# Patient Record
Sex: FEMALE | Race: WHITE | Employment: UNEMPLOYED | URBAN - METROPOLITAN AREA
[De-identification: names, ages, dates, MRNs, and addresses within clinical notes are randomized per-mention and may not be internally consistent; named-entity substitution may affect disease eponyms.]

---

## 2017-01-25 RX ORDER — ALENDRONATE SODIUM 70 MG/1
TABLET ORAL
Qty: 4 TABLET | Refills: 5 | Status: SHIPPED | OUTPATIENT
Start: 2017-01-25 | End: 2017-07-19 | Stop reason: SDUPTHER

## 2017-04-04 RX ORDER — ACETAMINOPHEN 325 MG/1
TABLET ORAL
Qty: 30 TABLET | Refills: 5 | Status: SHIPPED | OUTPATIENT
Start: 2017-04-04 | End: 2018-04-03 | Stop reason: SDUPTHER

## 2017-04-12 RX ORDER — GABAPENTIN 100 MG/1
CAPSULE ORAL
Qty: 28 CAPSULE | Refills: 5 | Status: SHIPPED | OUTPATIENT
Start: 2017-04-12 | End: 2017-09-26 | Stop reason: SDUPTHER

## 2017-04-12 RX ORDER — DOXEPIN HYDROCHLORIDE 25 MG/1
CAPSULE ORAL
Qty: 28 CAPSULE | Refills: 5 | Status: SHIPPED | OUTPATIENT
Start: 2017-04-12 | End: 2017-09-26 | Stop reason: SDUPTHER

## 2017-04-17 RX ORDER — LATANOPROST 50 UG/ML
SOLUTION/ DROPS OPHTHALMIC
Qty: 3 BOTTLE | Refills: 5 | Status: SHIPPED | OUTPATIENT
Start: 2017-04-17 | End: 2018-01-03 | Stop reason: SDUPTHER

## 2017-05-08 RX ORDER — FAMOTIDINE 20 MG/1
TABLET, FILM COATED ORAL
Qty: 56 TABLET | Refills: 11 | Status: SHIPPED | OUTPATIENT
Start: 2017-05-08 | End: 2017-12-27 | Stop reason: SDUPTHER

## 2017-06-07 RX ORDER — LORAZEPAM 0.5 MG/1
TABLET ORAL
Qty: 84 TABLET | Refills: 5 | OUTPATIENT
Start: 2017-06-07 | End: 2017-11-21 | Stop reason: SDUPTHER

## 2017-06-27 RX ORDER — TRAMADOL HYDROCHLORIDE 50 MG/1
TABLET ORAL
Qty: 20 TABLET | Refills: 5 | Status: SHIPPED | OUTPATIENT
Start: 2017-06-27

## 2017-07-19 RX ORDER — ALENDRONATE SODIUM 70 MG/1
TABLET ORAL
Qty: 4 TABLET | Refills: 11 | Status: SHIPPED | OUTPATIENT
Start: 2017-07-19

## 2017-08-01 ENCOUNTER — TELEPHONE (OUTPATIENT)
Dept: INTERNAL MEDICINE CLINIC | Age: 82
End: 2017-08-01

## 2017-08-07 RX ORDER — PROMETHAZINE HYDROCHLORIDE 25 MG/1
TABLET ORAL
Qty: 30 TABLET | Refills: 2 | Status: SHIPPED | OUTPATIENT
Start: 2017-08-07 | End: 2017-09-18 | Stop reason: SDUPTHER

## 2017-08-30 RX ORDER — CHOLECALCIFEROL (VITAMIN D3) 125 MCG
CAPSULE ORAL
Qty: 28 TABLET | Refills: 11 | Status: SHIPPED | OUTPATIENT
Start: 2017-08-30

## 2017-08-30 RX ORDER — POTASSIUM CHLORIDE 750 MG/1
TABLET, FILM COATED, EXTENDED RELEASE ORAL
Qty: 112 TABLET | Refills: 11 | Status: SHIPPED | OUTPATIENT
Start: 2017-08-30

## 2017-09-18 RX ORDER — PROMETHAZINE HYDROCHLORIDE 25 MG/1
TABLET ORAL
Qty: 30 TABLET | Refills: 2 | Status: SHIPPED | OUTPATIENT
Start: 2017-09-18 | End: 2017-10-30 | Stop reason: SDUPTHER

## 2017-09-26 RX ORDER — DOXEPIN HYDROCHLORIDE 25 MG/1
CAPSULE ORAL
Qty: 28 CAPSULE | Refills: 5 | Status: SHIPPED | OUTPATIENT
Start: 2017-09-26

## 2017-09-26 RX ORDER — GABAPENTIN 100 MG/1
CAPSULE ORAL
Qty: 28 CAPSULE | Refills: 5 | Status: SHIPPED | OUTPATIENT
Start: 2017-09-26

## 2017-10-25 RX ORDER — ATORVASTATIN CALCIUM 10 MG/1
TABLET, FILM COATED ORAL
Qty: 28 TABLET | Refills: 4 | Status: SHIPPED | OUTPATIENT
Start: 2017-10-25 | End: 2018-03-14 | Stop reason: SDUPTHER

## 2017-10-25 RX ORDER — QUETIAPINE FUMARATE 25 MG/1
TABLET, FILM COATED ORAL
Qty: 56 TABLET | Refills: 10 | Status: SHIPPED | OUTPATIENT
Start: 2017-10-25

## 2017-10-30 RX ORDER — PROMETHAZINE HYDROCHLORIDE 25 MG/1
TABLET ORAL
Qty: 30 TABLET | Refills: 2 | Status: SHIPPED | OUTPATIENT
Start: 2017-10-30 | End: 2018-05-13 | Stop reason: SDUPTHER

## 2017-11-22 RX ORDER — LORAZEPAM 0.5 MG/1
TABLET ORAL
Qty: 84 TABLET | Refills: 5 | Status: SHIPPED | OUTPATIENT
Start: 2017-11-22 | End: 2017-11-24 | Stop reason: SDUPTHER

## 2017-11-27 RX ORDER — LORAZEPAM 0.5 MG/1
TABLET ORAL
Qty: 84 TABLET | Refills: 5 | OUTPATIENT
Start: 2017-11-27 | End: 2018-04-11 | Stop reason: SDUPTHER

## 2017-12-27 RX ORDER — ERGOCALCIFEROL 1.25 MG/1
CAPSULE ORAL
Qty: 1 CAPSULE | Refills: 5 | Status: SHIPPED | OUTPATIENT
Start: 2017-12-27 | End: 2018-01-17 | Stop reason: SDUPTHER

## 2017-12-27 RX ORDER — MELATONIN
Qty: 28 TABLET | Refills: 5 | Status: SHIPPED | OUTPATIENT
Start: 2017-12-27

## 2017-12-27 RX ORDER — FAMOTIDINE 20 MG/1
TABLET, FILM COATED ORAL
Qty: 14 TABLET | Refills: 5 | Status: SHIPPED | OUTPATIENT
Start: 2017-12-27 | End: 2018-01-17 | Stop reason: SDUPTHER

## 2018-01-03 ENCOUNTER — TELEPHONE (OUTPATIENT)
Dept: INTERNAL MEDICINE CLINIC | Age: 83
End: 2018-01-03

## 2018-01-03 RX ORDER — LATANOPROST 50 UG/ML
SOLUTION/ DROPS OPHTHALMIC
Qty: 3 BOTTLE | Refills: 5 | Status: SHIPPED | OUTPATIENT
Start: 2018-01-03 | End: 2018-06-27 | Stop reason: SDUPTHER

## 2018-01-03 NOTE — TELEPHONE ENCOUNTER
Alex called to  to change directions for Latanoprost Opth drops. Directions currently say 1 drop in both eyes  qhs. The nurse wants directions changed to 1 drop in right eye only.

## 2018-01-04 RX ORDER — POLYETHYLENE GLYCOL 3350 17 G/17G
POWDER, FOR SOLUTION ORAL
Qty: 527 G | Refills: 5 | Status: SHIPPED | OUTPATIENT
Start: 2018-01-04 | End: 2019-03-03 | Stop reason: SDUPTHER

## 2018-01-18 RX ORDER — LANOLIN ALCOHOL/MO/W.PET/CERES
CREAM (GRAM) TOPICAL
Qty: 56 TABLET | Refills: 5 | Status: SHIPPED | OUTPATIENT
Start: 2018-01-18 | End: 2018-07-05 | Stop reason: SDUPTHER

## 2018-01-18 RX ORDER — FAMOTIDINE 20 MG/1
TABLET, FILM COATED ORAL
Qty: 28 TABLET | Refills: 5 | Status: SHIPPED | OUTPATIENT
Start: 2018-01-18 | End: 2018-07-05 | Stop reason: SDUPTHER

## 2018-01-18 RX ORDER — ERGOCALCIFEROL 1.25 MG/1
CAPSULE ORAL
Qty: 4 CAPSULE | Refills: 3 | Status: SHIPPED | OUTPATIENT
Start: 2018-01-18 | End: 2019-02-12 | Stop reason: SDUPTHER

## 2018-02-01 ENCOUNTER — TELEPHONE (OUTPATIENT)
Dept: INTERNAL MEDICINE CLINIC | Age: 83
End: 2018-02-01

## 2018-02-14 RX ORDER — ACETAMINOPHEN 160 MG
TABLET,DISINTEGRATING ORAL
Qty: 84 CAPSULE | Refills: 5 | Status: SHIPPED | OUTPATIENT
Start: 2018-02-14 | End: 2018-08-02 | Stop reason: SDUPTHER

## 2018-02-23 ENCOUNTER — TELEPHONE (OUTPATIENT)
Dept: INTERNAL MEDICINE CLINIC | Age: 83
End: 2018-02-23

## 2018-03-15 RX ORDER — ATORVASTATIN CALCIUM 10 MG/1
TABLET, FILM COATED ORAL
Qty: 28 TABLET | Refills: 11 | Status: SHIPPED | OUTPATIENT
Start: 2018-03-15 | End: 2019-01-16 | Stop reason: SDUPTHER

## 2018-04-03 RX ORDER — ACETAMINOPHEN 325 MG/1
TABLET ORAL
Qty: 30 TABLET | Refills: 5 | Status: SHIPPED | OUTPATIENT
Start: 2018-04-03 | End: 2019-01-12 | Stop reason: SDUPTHER

## 2018-04-12 RX ORDER — LORAZEPAM 0.5 MG/1
TABLET ORAL
Qty: 84 TABLET | Refills: 5 | Status: SHIPPED | OUTPATIENT
Start: 2018-04-12 | End: 2018-09-26 | Stop reason: SDUPTHER

## 2018-05-14 RX ORDER — PROMETHAZINE HYDROCHLORIDE 25 MG/1
TABLET ORAL
Qty: 30 TABLET | Refills: 5 | Status: SHIPPED | OUTPATIENT
Start: 2018-05-14 | End: 2018-12-10 | Stop reason: SDUPTHER

## 2018-06-06 RX ORDER — MIRTAZAPINE 15 MG/1
TABLET, FILM COATED ORAL
Qty: 28 TABLET | Refills: 5 | Status: SHIPPED | OUTPATIENT
Start: 2018-06-06 | End: 2018-10-17 | Stop reason: SDUPTHER

## 2018-06-06 RX ORDER — OLANZAPINE 5 MG/1
TABLET ORAL
Qty: 28 TABLET | Refills: 5 | Status: SHIPPED | OUTPATIENT
Start: 2018-06-06

## 2018-06-28 RX ORDER — LATANOPROST 50 UG/ML
SOLUTION/ DROPS OPHTHALMIC
Qty: 3 ML | Refills: 3 | Status: SHIPPED | OUTPATIENT
Start: 2018-06-28 | End: 2018-12-23 | Stop reason: SDUPTHER

## 2018-07-06 RX ORDER — LANOLIN ALCOHOL/MO/W.PET/CERES
CREAM (GRAM) TOPICAL
Qty: 56 TABLET | Refills: 11 | Status: SHIPPED | OUTPATIENT
Start: 2018-07-06 | End: 2019-05-07 | Stop reason: SDUPTHER

## 2018-07-06 RX ORDER — FAMOTIDINE 20 MG/1
TABLET, FILM COATED ORAL
Qty: 28 TABLET | Refills: 11 | Status: SHIPPED | OUTPATIENT
Start: 2018-07-06 | End: 2019-05-07 | Stop reason: SDUPTHER

## 2018-08-02 RX ORDER — ACETAMINOPHEN 160 MG
TABLET,DISINTEGRATING ORAL
Qty: 84 CAPSULE | Refills: 11 | Status: SHIPPED | OUTPATIENT
Start: 2018-08-02 | End: 2019-05-07 | Stop reason: SDUPTHER

## 2018-09-26 ENCOUNTER — TELEPHONE (OUTPATIENT)
Dept: INTERNAL MEDICINE CLINIC | Age: 83
End: 2018-09-26

## 2018-09-26 DIAGNOSIS — F41.9 ANXIETY: Primary | ICD-10-CM

## 2018-09-26 RX ORDER — LORAZEPAM 0.5 MG/1
TABLET ORAL
Qty: 84 TABLET | Refills: 5 | Status: SHIPPED | OUTPATIENT
Start: 2018-09-26 | End: 2019-03-12 | Stop reason: SDUPTHER

## 2018-10-18 RX ORDER — MIRTAZAPINE 15 MG/1
TABLET, FILM COATED ORAL
Qty: 28 TABLET | Refills: 5 | Status: SHIPPED | OUTPATIENT
Start: 2018-10-18 | End: 2019-04-05 | Stop reason: SDUPTHER

## 2018-12-10 RX ORDER — PROMETHAZINE HYDROCHLORIDE 25 MG/1
TABLET ORAL
Qty: 30 TABLET | Refills: 5 | Status: SHIPPED | OUTPATIENT
Start: 2018-12-10

## 2018-12-24 RX ORDER — LATANOPROST 50 UG/ML
SOLUTION/ DROPS OPHTHALMIC
Qty: 2.5 ML | Refills: 5 | Status: SHIPPED | OUTPATIENT
Start: 2018-12-24 | End: 2019-01-22 | Stop reason: SDUPTHER

## 2019-01-14 RX ORDER — ACETAMINOPHEN 325 MG/1
TABLET ORAL
Qty: 30 TABLET | Refills: 5 | Status: SHIPPED | OUTPATIENT
Start: 2019-01-14

## 2019-01-17 RX ORDER — ATORVASTATIN CALCIUM 10 MG/1
TABLET, FILM COATED ORAL
Qty: 28 TABLET | Refills: 11 | Status: SHIPPED | OUTPATIENT
Start: 2019-01-17

## 2019-01-22 RX ORDER — LATANOPROST 50 UG/ML
SOLUTION/ DROPS OPHTHALMIC
Qty: 2.5 ML | Refills: 0 | Status: SHIPPED | OUTPATIENT
Start: 2019-01-22

## 2019-02-13 RX ORDER — ERGOCALCIFEROL 1.25 MG/1
CAPSULE ORAL
Qty: 4 CAPSULE | Refills: 3 | Status: SHIPPED | OUTPATIENT
Start: 2019-02-13

## 2019-02-15 RX ORDER — ONDANSETRON 4 MG/1
TABLET, FILM COATED ORAL
Qty: 30 TABLET | Refills: 2 | Status: SHIPPED | OUTPATIENT
Start: 2019-02-15 | End: 2019-04-13 | Stop reason: SDUPTHER

## 2019-03-04 RX ORDER — POLYETHYLENE GLYCOL 3350 17 G/17G
POWDER, FOR SOLUTION ORAL
Qty: 527 G | Refills: 3 | Status: SHIPPED | OUTPATIENT
Start: 2019-03-04

## 2019-03-12 DIAGNOSIS — F41.9 ANXIETY: ICD-10-CM

## 2019-03-13 RX ORDER — LORAZEPAM 0.5 MG/1
TABLET ORAL
Qty: 90 TABLET | Refills: 5 | Status: SHIPPED | OUTPATIENT
Start: 2019-03-13 | End: 2019-07-22 | Stop reason: SDUPTHER

## 2019-04-05 RX ORDER — MIRTAZAPINE 15 MG/1
TABLET, FILM COATED ORAL
Qty: 28 TABLET | Refills: 5 | Status: SHIPPED | OUTPATIENT
Start: 2019-04-05

## 2019-04-15 RX ORDER — ONDANSETRON 4 MG/1
TABLET, FILM COATED ORAL
Qty: 30 TABLET | Refills: 5 | Status: SHIPPED | OUTPATIENT
Start: 2019-04-15

## 2019-05-08 RX ORDER — ACETAMINOPHEN 160 MG
TABLET,DISINTEGRATING ORAL
Qty: 84 CAPSULE | Refills: 5 | Status: SHIPPED | OUTPATIENT
Start: 2019-05-08

## 2019-05-08 RX ORDER — FAMOTIDINE 20 MG/1
TABLET, FILM COATED ORAL
Qty: 28 TABLET | Refills: 11 | Status: SHIPPED | OUTPATIENT
Start: 2019-05-08

## 2019-06-25 ENCOUNTER — TELEPHONE (OUTPATIENT)
Dept: INTERNAL MEDICINE CLINIC | Age: 84
End: 2019-06-25

## 2019-06-25 NOTE — TELEPHONE ENCOUNTER
Pt's niece wants to know if Dr Fredy Pulmmer is not her aunt's dr and hasn't seen her for 5 years, why is there a signed physical form in her chart at [facility].     Don or Dr Daniel Cremaggie - please call the niece

## 2019-07-10 ENCOUNTER — TELEPHONE (OUTPATIENT)
Dept: INTERNAL MEDICINE CLINIC | Age: 84
End: 2019-07-10

## 2019-07-22 DIAGNOSIS — F41.9 ANXIETY: ICD-10-CM

## 2019-07-23 RX ORDER — LORAZEPAM 0.5 MG/1
TABLET ORAL
Qty: 90 TABLET | Refills: 5 | Status: SHIPPED | OUTPATIENT
Start: 2019-07-23 | End: 2020-01-19

## 2019-08-05 ENCOUNTER — TELEPHONE (OUTPATIENT)
Dept: INTERNAL MEDICINE CLINIC | Age: 84
End: 2019-08-05

## 2019-08-05 NOTE — TELEPHONE ENCOUNTER
Calling with EKG abnormal results:    Sinus tachycardia  Normal QTc  Consider inferior MI age undetermined  Abnormal EKG   Abnormal ECG      Patient is not walking. Real unsteady on feet. Took two to get up.   Awaiting urine and blood test results tomorrow     Faxing EKG results now

## 2019-09-11 ENCOUNTER — TELEPHONE (OUTPATIENT)
Dept: INTERNAL MEDICINE CLINIC | Age: 84
End: 2019-09-11

## 2019-09-13 ENCOUNTER — TELEPHONE (OUTPATIENT)
Dept: INTERNAL MEDICINE CLINIC | Age: 84
End: 2019-09-13

## 2019-09-13 NOTE — TELEPHONE ENCOUNTER
FYI   Nurse calling stating that they found patient on the floor, laying flat on back and feet straight out  Red area on lower thoracic spine  Patient states that she hit her head but she is up and alert.    Neuro assessment good  Vitals were stable /54 o2 94%

## 2019-09-25 ENCOUNTER — OUTSIDE SERVICES (OUTPATIENT)
Dept: WOUND CARE | Age: 84
End: 2019-09-25
Payer: MEDICARE

## 2019-09-25 DIAGNOSIS — L89.620 PRESSURE INJURY OF LEFT HEEL, UNSTAGEABLE (HCC): ICD-10-CM

## 2019-09-25 DIAGNOSIS — S31.801A TEAR OF SKIN OF BUTTOCK, UNSPECIFIED LATERALITY, INITIAL ENCOUNTER: ICD-10-CM

## 2019-09-25 PROCEDURE — 99308 SBSQ NF CARE LOW MDM 20: CPT | Performed by: CLINICAL NURSE SPECIALIST

## 2019-09-25 NOTE — PROGRESS NOTES
88 [facility]    Patient name: Bienvenido Alanis  :   [birth year]  Facility:  [address] [place] [address] of Service: skilled nursing facility (95)    Primary diagnosis for wound-care consultation: Unstageable pressure area to left heel and skin tear gluteal cleft. Additional ulcer(s) noted? None    History of Present Illness: Patient with recent fractured left hip. Recent sepsis secondary to UTI, dysphagia, severe episodes of recurrent major depressive disorder, anxiety, protein calorie malnutrition, Parkinson's disease and essential hypertension. Patient is incontinent. Review of Systems: Pertinent systems reviewed in the HPI; all other systems reviewed, and negative. Pertinent elements of past medical, surgical, family, and/or social history: As above    Medications and allergies are detailed in the nursing home chart, and were reviewed by me today.  _______________________    General Physical exam:    Vital signs:  Blood pressure 112/49, temperature 90.8, pulse 93, respirations 16, 96% saturation on 2 L    General Appearance: Alert, well developed and well-nourished, in no acute distress  Psychiatric:  Mood and affect appropriate for situation  Skin: warm and dry, no rash  Head: normocephalic and atraumatic  Eyes: pupils equal, round, sclerae anicteric, conjunctivae normal  ENT: no thrush or oral ulcers  Neck:No complaints, normal appearance  Pulmonary/Chest: Respirations easy at rest, no cough or respiratory distress  Cardiovascular: No chest pain, normal rate, toes warm, cap refill normal, PT and DP pulse palpable  Abdomen: No nausea or vomiting  Extremities: no cyanosis, edema or cellulitis  Musculoskeletal: Nonambulatory, moves all extremities minimally, no deformities  Neurologic: distal sensation to light touch intact, no allodynia.       Wound exam:    Wound location: Gluteal cleft   Length (cm) 2   Width (cm) 0.5   Depth (cm) 0.1   Tunneling 0   Undermining 0    Wound type:   Skin Tear  Grade - stage - thickness: Partial thickness     Description of periwound: intact    Description of wound bed: Wound with red base. Wound bed moist, small amount of serous drainage, edges open and attached. Surrounding tissue and ulcer without signs and symptoms of infection. No purulence, malodor, erythema, increased temperature, or increased pain. _Wound exam:    Wound location: Left heel   Length (cm) 3   Width (cm) 5   Depth (cm) 0.1   Tunneling 0   Undermining 0    Wound type:   Pressure  Grade - stage - thickness: Unstageable     Description of periwound: Intact    Description of wound bed: Wound with dry, fully adherent, intact eschar. Surrounding tissue and ulcer without signs and symptoms of infection. No purulence, malodor, erythema, increased temperature, or increased pain.  ______________________    Recent labs and data reviewed: 9/20/2019: Hemoglobin/hematocrit: 7.3/22.1, BUN 14, creatinine 0.57, GFR 85  _______________________     Blondell Flock diagnoses & assessment:  Unstageable pressure ulcer left heel and skin tear to gluteal cleft    Debridement is not indicated today, based on the history and exam above.  _______________________    Procedure:    Consent obtained. Time out performed per [facility]. Dressing applied  _______________________    Recommendations:    - Dressings / Compression / Offloading: Betadine left heel daily. Bilateral off loading boots. Triad, Aquacel Ag and hydrocolloid to gluteal cleft. Low air loss mattress. Side to side when in bed. Pressure redistribution chair cushion. Minimal time in chair.     - Labs / Diagnostic studies: none    - Medications / nutritional support: Stress tab with zinc daily. Promod 30 mL daily. Ensure Plus 2 times a day.     - Further Consultations recommended: OT/PT/ST following    - Anticipated follow-up: Weekly

## 2019-10-08 ENCOUNTER — HOSPITAL ENCOUNTER (OUTPATIENT)
Dept: GENERAL RADIOLOGY | Age: 84
Discharge: HOME | End: 2019-10-08
Payer: COMMERCIAL

## 2019-10-08 ENCOUNTER — HOSPITAL ENCOUNTER (OUTPATIENT)
Dept: SPEECH THERAPY | Age: 84
Setting detail: THERAPIES SERIES
Discharge: HOME | End: 2019-10-08
Payer: MEDICARE

## 2019-10-08 DIAGNOSIS — R13.10 DYSPHAGIA, UNSPECIFIED TYPE: ICD-10-CM

## 2019-10-08 DIAGNOSIS — R13.12 OROPHARYNGEAL DYSPHAGIA: Primary | ICD-10-CM

## 2019-10-08 PROCEDURE — 92611 MOTION FLUOROSCOPY/SWALLOW: CPT

## 2019-10-08 PROCEDURE — 74230 X-RAY XM SWLNG FUNCJ C+: CPT

## 2019-10-09 ENCOUNTER — OUTSIDE SERVICES (OUTPATIENT)
Dept: WOUND CARE | Age: 84
End: 2019-10-09
Payer: MEDICARE

## 2019-10-09 DIAGNOSIS — L89.620 PRESSURE INJURY OF LEFT HEEL, UNSTAGEABLE (HCC): ICD-10-CM

## 2019-10-09 PROCEDURE — 99308 SBSQ NF CARE LOW MDM 20: CPT | Performed by: CLINICAL NURSE SPECIALIST

## 2019-10-23 ENCOUNTER — OUTSIDE SERVICES (OUTPATIENT)
Dept: WOUND CARE | Age: 84
End: 2019-10-23
Payer: MEDICARE

## 2019-10-23 DIAGNOSIS — L89.620 PRESSURE INJURY OF LEFT HEEL, UNSTAGEABLE (HCC): ICD-10-CM

## 2019-10-23 PROCEDURE — 97597 DBRDMT OPN WND 1ST 20 CM/<: CPT | Performed by: CLINICAL NURSE SPECIALIST

## 2019-10-23 PROCEDURE — 99308 SBSQ NF CARE LOW MDM 20: CPT | Performed by: CLINICAL NURSE SPECIALIST

## 2019-11-06 ENCOUNTER — OUTSIDE SERVICES (OUTPATIENT)
Dept: WOUND CARE | Age: 84
End: 2019-11-06
Payer: MEDICARE

## 2019-11-06 DIAGNOSIS — L89.620 PRESSURE INJURY OF LEFT HEEL, UNSTAGEABLE (HCC): ICD-10-CM

## 2019-11-06 PROCEDURE — 11042 DBRDMT SUBQ TIS 1ST 20SQCM/<: CPT | Performed by: CLINICAL NURSE SPECIALIST

## 2019-11-06 PROCEDURE — 99308 SBSQ NF CARE LOW MDM 20: CPT | Performed by: CLINICAL NURSE SPECIALIST

## 2019-11-13 ENCOUNTER — OUTSIDE SERVICES (OUTPATIENT)
Dept: WOUND CARE | Age: 84
End: 2019-11-13
Payer: MEDICARE

## 2019-11-13 DIAGNOSIS — L89.620 PRESSURE INJURY OF LEFT HEEL, UNSTAGEABLE (HCC): ICD-10-CM

## 2019-11-13 PROCEDURE — 97597 DBRDMT OPN WND 1ST 20 CM/<: CPT | Performed by: CLINICAL NURSE SPECIALIST

## 2019-11-13 PROCEDURE — 99308 SBSQ NF CARE LOW MDM 20: CPT | Performed by: CLINICAL NURSE SPECIALIST

## 2019-11-20 ENCOUNTER — OUTSIDE SERVICES (OUTPATIENT)
Dept: WOUND CARE | Age: 84
End: 2019-11-20
Payer: MEDICARE

## 2019-11-20 DIAGNOSIS — L89.620 PRESSURE INJURY OF LEFT HEEL, UNSTAGEABLE (HCC): ICD-10-CM

## 2019-11-20 PROCEDURE — 99308 SBSQ NF CARE LOW MDM 20: CPT | Performed by: CLINICAL NURSE SPECIALIST

## 2019-11-20 PROCEDURE — 97597 DBRDMT OPN WND 1ST 20 CM/<: CPT | Performed by: CLINICAL NURSE SPECIALIST

## 2019-11-27 ENCOUNTER — TELEPHONE (OUTPATIENT)
Dept: INTERNAL MEDICINE CLINIC | Age: 84
End: 2019-11-27

## 2019-12-03 ENCOUNTER — TELEPHONE (OUTPATIENT)
Dept: INTERNAL MEDICINE CLINIC | Age: 84
End: 2019-12-03

## 2019-12-03 DIAGNOSIS — M86.172 ACUTE OSTEOMYELITIS OF LEFT CALCANEUS (HCC): Primary | ICD-10-CM

## 2019-12-04 ENCOUNTER — OUTSIDE SERVICES (OUTPATIENT)
Dept: WOUND CARE | Age: 84
End: 2019-12-04
Payer: MEDICARE

## 2019-12-04 DIAGNOSIS — I73.9 PVD (PERIPHERAL VASCULAR DISEASE) (HCC): ICD-10-CM

## 2019-12-04 DIAGNOSIS — L89.620 PRESSURE INJURY OF LEFT HEEL, UNSTAGEABLE (HCC): ICD-10-CM

## 2019-12-04 PROCEDURE — 99308 SBSQ NF CARE LOW MDM 20: CPT | Performed by: CLINICAL NURSE SPECIALIST

## 2019-12-09 ENCOUNTER — TELEPHONE (OUTPATIENT)
Dept: INTERNAL MEDICINE CLINIC | Age: 84
End: 2019-12-09

## 2019-12-10 ENCOUNTER — HOSPITAL ENCOUNTER (OUTPATIENT)
Dept: MRI IMAGING | Age: 84
Discharge: HOME | End: 2019-12-10
Payer: MEDICARE

## 2019-12-11 ENCOUNTER — OUTSIDE SERVICES (OUTPATIENT)
Dept: WOUND CARE | Age: 84
End: 2019-12-11
Payer: MEDICARE

## 2019-12-11 DIAGNOSIS — L89.620 PRESSURE INJURY OF LEFT HEEL, UNSTAGEABLE (HCC): ICD-10-CM

## 2019-12-11 PROCEDURE — 99308 SBSQ NF CARE LOW MDM 20: CPT | Performed by: CLINICAL NURSE SPECIALIST

## 2019-12-11 PROCEDURE — 97597 DBRDMT OPN WND 1ST 20 CM/<: CPT | Performed by: CLINICAL NURSE SPECIALIST

## 2019-12-18 ENCOUNTER — OUTSIDE SERVICES (OUTPATIENT)
Dept: WOUND CARE | Age: 84
End: 2019-12-18
Payer: MEDICARE

## 2019-12-18 DIAGNOSIS — L89.620 PRESSURE INJURY OF LEFT HEEL, UNSTAGEABLE (HCC): ICD-10-CM

## 2019-12-18 PROCEDURE — 99308 SBSQ NF CARE LOW MDM 20: CPT | Performed by: CLINICAL NURSE SPECIALIST

## 2019-12-18 PROCEDURE — 97597 DBRDMT OPN WND 1ST 20 CM/<: CPT | Performed by: CLINICAL NURSE SPECIALIST

## 2019-12-23 ENCOUNTER — HOSPITAL ENCOUNTER (OUTPATIENT)
Dept: MRI IMAGING | Age: 84
Discharge: HOME | End: 2019-12-23
Payer: MEDICARE

## 2019-12-23 DIAGNOSIS — M86.172 ACUTE OSTEOMYELITIS OF LEFT CALCANEUS (HCC): ICD-10-CM

## 2019-12-23 PROCEDURE — 6360000004 HC RX CONTRAST MEDICATION: Performed by: INTERNAL MEDICINE

## 2019-12-23 PROCEDURE — A9577 INJ MULTIHANCE: HCPCS | Performed by: INTERNAL MEDICINE

## 2019-12-23 PROCEDURE — 73720 MRI LWR EXTREMITY W/O&W/DYE: CPT

## 2019-12-23 RX ADMIN — GADOBENATE DIMEGLUMINE 5 ML: 529 INJECTION, SOLUTION INTRAVENOUS at 13:24

## 2019-12-27 ENCOUNTER — OUTSIDE SERVICES (OUTPATIENT)
Dept: WOUND CARE | Age: 84
End: 2019-12-27
Payer: MEDICARE

## 2019-12-27 DIAGNOSIS — L89.620 PRESSURE INJURY OF LEFT HEEL, UNSTAGEABLE (HCC): ICD-10-CM

## 2019-12-27 PROCEDURE — 97597 DBRDMT OPN WND 1ST 20 CM/<: CPT | Performed by: CLINICAL NURSE SPECIALIST

## 2020-01-08 ENCOUNTER — OUTSIDE SERVICES (OUTPATIENT)
Dept: WOUND CARE | Age: 85
End: 2020-01-08
Payer: MEDICARE

## 2020-01-08 PROCEDURE — 97597 DBRDMT OPN WND 1ST 20 CM/<: CPT | Performed by: CLINICAL NURSE SPECIALIST

## 2020-01-08 NOTE — PROGRESS NOTES
88 [facility]    Patient name: Evan Mcrae  :   [birth year]  Facility:  [address] [place] [address] Service: nursing facility (52)    Primary diagnosis for wound-care consultation: Stage 4 pressure injury left heel. Slow to heal.     Additional ulcer(s) noted? None. Fungal rash to buttock    History of Present Illness: Stage IV pressure injury to left heel. MRI 2019: No mention of osteomyelitis. Wound is improving with increased granulation and decreased fibrin and nonviable tissue. Patient is offloading. No vascular consult this time. Appetite is fair. On protein supplements. No fever or chills. Review of Systems: Pertinent systems reviewed in the HPI; all other systems reviewed, and negative. Pertinent elements of past medical, surgical, family, and/or social history: No changes this past week    Medications and allergies are detailed in the nursing home chart, and were reviewed by me today.  _______________________    General Physical exam:    Vital signs:  109/64, temperature 97.4, pulse 92, respirations 20, O2 sat 96% on room air    General Appearance: alert and oriented to person, place and time, well developed and well-nourished, in no acute distress  Psychiatric:  Mood and affect appropriate for situation  Skin: warm and dry, fungal rash to buttock  Head: normocephalic and atraumatic  Eyes: pupils equal, round, sclerae anicteric, conjunctivae normal  ENT: no thrush or oral ulcers  Neck:No complaints, normal appearance  Pulmonary/Chest: Respirations easy at rest, no cough or respiratory distress  Cardiovascular: No chest pain, normal rate, toes warm, cap refill normal, PT and DP pulse audible with doppler  Abdomen: No nausea or vomiting  Extremities: no cyanosis, edema or cellulitis  Musculoskeletal: Ambulatory, moves all extremities, no deformities  Neurologic: distal sensation to light touch intact, no allodynia.       Wound exam:    Wound times per day    - Labs / Diagnostic studies: None    - Medications / nutritional support: Ensure Plus 2 times a day.   Stress tab with zinc and Promod daily.    - Further Consultations recommended: None    - Anticipated follow-up: Weekly evaluation  _______________________    Electronically signed by ROSALIND Cid CNP on 1/8/2020 at 11:29 AM

## 2020-01-15 ENCOUNTER — OUTSIDE SERVICES (OUTPATIENT)
Dept: WOUND CARE | Age: 85
End: 2020-01-15
Payer: MEDICARE

## 2020-01-15 PROCEDURE — 99308 SBSQ NF CARE LOW MDM 20: CPT | Performed by: CLINICAL NURSE SPECIALIST

## 2020-01-22 ENCOUNTER — OUTSIDE SERVICES (OUTPATIENT)
Dept: WOUND CARE | Age: 85
End: 2020-01-22
Payer: MEDICARE

## 2020-01-22 PROCEDURE — 97597 DBRDMT OPN WND 1ST 20 CM/<: CPT | Performed by: CLINICAL NURSE SPECIALIST

## 2020-01-22 NOTE — PROGRESS NOTES
periwound: Scar tissue    Description of wound bed: Wound with red granulation, biofilm and fibrin. Wound bed moist, moderate amount of serous drainage, edges open and attached. Surrounding tissue and ulcer without signs and symptoms of infection. No purulence, malodor, erythema, increased temperature, or increased pain.  _______________________    Recent labs and data reviewed: No new labs  _______________________     Alben Mole diagnoses & assessment:  Stage 4 pressure injury left heel, slow to heal    Debridement is  indicated today, based on the history and exam above.  _______________________    Procedure:    Consent obtained. Time out performed per [facility]. Topical anesthetic applied: 5% topical lidocaine. Using a curette, I sharply debrided the left heel ulcer(s) down through and including the removal of epidermis and dermis. The type(s) of tissue debrided included fibrin, biofilm and exudate. Total Surface Area Debrided: 0.3 sq cm. The ulcers were then irrigated with normal saline solution. The procedure was completed with a small amount of bleeding, and hemostasis was by pressure. The patient tolerated the procedure well, with no significant complications. The patient's level of pain during and after the procedure was monitored. If post-debridement measurements are significantly different from initial measurements, those will be noted here.   _______________________    Recommendations:    - Dressings / Compression / Offloading: Santyl, alginate AG and dry dressing daily. Offloading boot when in bed or chair.    - Labs / Diagnostic studies: None    - Medications / nutritional support: Stress tab with zinc and Promod daily.   Ensure Plus 2 times a day    - Further Consultations recommended: none    - Anticipated follow-up: Weekly evaluation  _______________________    Electronically signed by ROSALIND Silva CNP on 1/22/2020 at 10:42 AM

## 2020-01-29 ENCOUNTER — OUTSIDE SERVICES (OUTPATIENT)
Dept: WOUND CARE | Age: 85
End: 2020-01-29
Payer: MEDICARE

## 2020-01-29 PROCEDURE — 99308 SBSQ NF CARE LOW MDM 20: CPT | Performed by: CLINICAL NURSE SPECIALIST

## 2020-01-29 NOTE — PROGRESS NOTES
88 [facility]    Patient name: Jessenia Nunez  :   [birth year]  Facility:  [address] [place] [address] Service: nursing facility (99)    Primary diagnosis for wound-care consultation: Pressure injury to left heel, stage IV. Chronic wound, slow to heal.  Patient is offloading. Appetite is fair. On protein supplements. No fever or chills. Additional ulcer(s) noted? None    History of Present Illness: Chronic wound. Improved today. Review of Systems: Pertinent systems reviewed in the HPI; all other systems reviewed, and negative. Pertinent elements of past medical, surgical, family, and/or social history: No changes this week except. Medications and allergies are detailed in the nursing home chart, and were reviewed by me today.  _______________________    General Physical exam:    Vital signs:  126/74, temperature 98.4, pulse 99, respirations 18, 97% on room air    General Appearance: alert and oriented to person, place and time, well developed and well-nourished, in no acute distress  Psychiatric:  Mood and affect appropriate for situation  Skin: warm and dry, no rash  Head: normocephalic and atraumatic  Eyes: pupils equal, round, sclerae anicteric, conjunctivae normal  ENT: no thrush or oral ulcers  Neck:No complaints, normal appearance  Pulmonary/Chest: Respirations easy at rest, no cough or respiratory distress  Cardiovascular: No chest pain, normal rate, toes warm, cap refill normal, PT and DP pulse audible with doppler  Abdomen: No nausea or vomiting  Extremities: no cyanosis, edema or cellulitis  Musculoskeletal: Ambulatory, moves all extremities, no deformities  Neurologic: distal sensation to light touch intact, no allodynia.       Wound exam:    Wound location: Left heel   Length (cm) 0.3   Width (cm) 0.2   Depth (cm) 0.1   Tunneling 0   Undermining 0    Wound type:   Pressure  Grade - stage - thickness: Stage 4     Description of periwound: Scar tissue    Description of wound bed: Wound with red granulation and new epithelium. Wound bed moist, small amount of serous drainage, edges open and attached. Surrounding tissue and ulcer without signs and symptoms of infection. No purulence, malodor, erythema, increased temperature, or increased pain.  _______________________    Recent labs and data reviewed: No recent labs  _______________________     Frankie Red diagnoses & assessment:  Stage 4 pressure injury to left heel, healing with current treatment. Debridement is not indicated today, based on the history and exam above.  _______________________    Procedure:    Consent obtained. Time out performed per [facility]. _______________________    Recommendations:    - Dressings / Compression / Offloading: Santyl, alginate AG and dry dressing daily. Offloading boot when in bed or chair    - Labs / Diagnostic studies: None    - Medications / nutritional support: Stress tab with zinc and Promod daily.   Ensure Plus 2 times a day    - Further Consultations recommended: None    - Anticipated follow-up: Weekly evaluation  _______________________    Electronically signed by ROSALIND Velasco CNP on 1/29/2020 at 6:33 PM

## 2020-02-05 ENCOUNTER — OUTSIDE SERVICES (OUTPATIENT)
Dept: WOUND CARE | Age: 85
End: 2020-02-05
Payer: MEDICARE

## 2020-02-05 PROCEDURE — 97597 DBRDMT OPN WND 1ST 20 CM/<: CPT | Performed by: CLINICAL NURSE SPECIALIST

## 2020-02-05 NOTE — PROGRESS NOTES
88 [facility]    Patient name: Angelica Duff  :   [birth year]  Facility:  [address] [place] [address] Service: nursing facility (07)    Primary diagnosis for wound-care consultation: Stage IV pressure injury to left heel, slow to heal.  Appetite is fair. On protein supplements. No fever or chills. Patient is offloading. Additional ulcer(s) noted?  none    History of Present Illness: Patient with major depressive disorder, osteoporosis and Parkinson's disease,    Review of Systems: Pertinent systems reviewed in the HPI; all other systems reviewed, and negative. Pertinent elements of past medical, surgical, family, and/or social history: As above, no changes this week    Medications and allergies are detailed in the nursing home chart, and were reviewed by me today.  _______________________    General Physical exam:    Vital signs:  107/66, 97.7, 107, 14, 94% sats    General Appearance: alert and oriented to person, place and time, well developed and well-nourished, in no acute distress  Psychiatric:  Mood and affect appropriate for situation  Skin: warm and dry, no rash  Head: normocephalic and atraumatic  Eyes: pupils equal, round, sclerae anicteric, conjunctivae normal  ENT: no thrush or oral ulcers  Neck:No complaints, normal appearance  Pulmonary/Chest: Respirations easy at rest, no cough or respiratory distress  Cardiovascular: No chest pain, normal rate, toes warm, cap refill normal, PT and DP pulse audible with Doppler  Abdomen: No nausea or vomiting  Extremities: no cyanosis, edema or cellulitis  Musculoskeletal: Ambulatory, moves all extremities, no deformities  Neurologic: distal sensation to light touch intact, no allodynia.       Wound exam:    Wound location: Left Heel   Length (cm) 0.4   Width (cm) 0.2   Depth (cm) 0.2   Tunneling 0   Undermining 0    Wound type:   Pressure  Grade - stage - thickness: Stage 4     Description of periwound: Scar

## 2020-02-12 ENCOUNTER — OUTSIDE SERVICES (OUTPATIENT)
Dept: WOUND CARE | Age: 85
End: 2020-02-12
Payer: MEDICARE

## 2020-02-12 PROCEDURE — 99308 SBSQ NF CARE LOW MDM 20: CPT | Performed by: CLINICAL NURSE SPECIALIST

## 2020-02-12 NOTE — PROGRESS NOTES
88 [facility]    Patient name: Scottie Loo  :   [birth year]  Facility:  [address] [place] [address] Service: nursing facility (33)    Primary diagnosis for wound-care consultation: Stage IV pressure injury to left heel, nearly resolved. Appetite is fair. On protein supplements. No fever or chills. Patient is offloading. Additional ulcer(s) noted?  none    History of Present Illness: Patient major depressive disorder, osteoporosis and Parkinson's disease. Review of Systems: Pertinent systems reviewed in the HPI; all other systems reviewed, and negative. Pertinent elements of past medical, surgical, family, and/or social history: No changes this week    Medications and allergies are detailed in the nursing home chart, and were reviewed by me today.  _______________________    General Physical exam:    Vital signs:  126/74, 98.4, 99, 18, 96% on room air    General Appearance: alert and oriented to person, place and time, well developed and well-nourished, in no acute distress  Psychiatric:  Mood and affect appropriate for situation  Skin: warm and dry, no rash  Head: normocephalic and atraumatic  Eyes: pupils equal, round, sclerae anicteric, conjunctivae normal  ENT: no thrush or oral ulcers  Neck:No complaints, normal appearance  Pulmonary/Chest: Respirations easy at rest, no cough or respiratory distress  Cardiovascular: No chest pain, normal rate, toes warm, cap refill normal, PT and DP pulse audible with doppler  Abdomen: No nausea or vomiting  Extremities: no cyanosis, edema or cellulitis  Musculoskeletal: Ambulatory, moves all extremities, no deformities  Neurologic: distal sensation to light touch intact, no allodynia.       Wound exam:    Wound location: Left Heel   Length (cm) 0.2   Width (cm) 0.2   Depth (cm) 0.2   Tunneling 0   Undermining 0    Wound type:   Pressure  Grade - stage - thickness: Stage 4     Description of periwound: Scar tissue    Description

## 2020-02-19 ENCOUNTER — OUTSIDE SERVICES (OUTPATIENT)
Dept: WOUND CARE | Age: 85
End: 2020-02-19
Payer: MEDICARE

## 2020-02-19 PROCEDURE — 99308 SBSQ NF CARE LOW MDM 20: CPT | Performed by: CLINICAL NURSE SPECIALIST

## 2020-02-19 NOTE — PROGRESS NOTES
purulence, malodor, erythema, increased temperature, or increased pain.  _______________________    Recent labs and data reviewed: No new labs  _______________________     Luke Call diagnoses & assessment:  Stage 4 pressure injury to left heel, resolved today    Debridement is not indicated today, based on the history and exam above.  _______________________    Procedure:    Consent obtained. Time out performed per [facility]. _______________________    Recommendations:    - Dressings / Compression / Offloading: Skin prep, gauze and Kerlix 2 times a week for 2 weeks then DC. Pad ankle before Kerlix. Continue offloading boots. - Labs / Diagnostic studies: None    - Medications / nutritional support: Ensure Plus 2 times a day. Promod and stress tab with zinc daily.    - Further Consultations recommended: Considering hospice. - Anticipated follow-up: Followed by nursing. Reconsult if necessary.   _______________________    Electronically signed by ROSALIND Albert CNP on 2/19/2020 at 5:20 PM